# Patient Record
(demographics unavailable — no encounter records)

---

## 2024-11-18 NOTE — PHYSICAL EXAM
[No Acute Distress] : no acute distress [Normal Appearance] : normal appearance [Normal Rhythm and Effort] : normal rhythm and effort [No Cyanosis] : no cyanosis [Normal Color/ Pigmentation] : normal color/ pigmentation [No Focal Deficits] : no focal deficits [Oriented x3] : oriented x3 [Normal Affect] : normal affect [TextBox_11] : pink and moist oral mucosa [TextBox_68] : No breathlessness or cough throughout the conversation

## 2024-11-18 NOTE — REASON FOR VISIT
[Home] : at home, [unfilled] , at the time of the visit. [Medical Office: ([facility])___] : at the medical office located in  [Other:____] : [unfilled] [Patient] : the patient [Self] : self [Consultation] : a consultation [TextBox_44] : cystic fibrosis evaluation [TextBox_13] : MD Ulises Guzman

## 2024-11-18 NOTE — HISTORY OF PRESENT ILLNESS
[TextBox_4] : Patient is a 56-year-old female with a past medical history of mild CHAPO focal bronchiectasis and mild CASA who presents via telemedicine for a cystic fibrosis consultation following a referral from MD Guzman. There is no known family history of cystic fibrosis, however, her 83-year-old mother is known to have bronchiectasis. It was mentioned her mother is colonized with various organisms including MAC. She is  with two children with includes a 20-year-old daughter and a 23-year-old son. She is self-employed as a relationship counselor and .  Bronchiectasis was reportedly identified approximately in February 2022 after seeking a pulmonary evaluation with MD Guzman following after seeking a medical evaluation in urgent care which subsequently led to an emergency room referral due to an abnormal EKG finding and wheezing. Prior to this incident she denied having any chronic pulmonary symptoms such as cough, wheezing, or sputum production. She recalls having recurrent episodes of strep throat infection and often would have food lodged in her tonsils which she was able to manually remove. Ultimately this resulted in her undergoing a tonsillectomy at age of 21. There is also a past history of chronic sinus infections as well as deviated nasal septum. Reportedly after having the tonsillectomy there was notable improvement in sinus symptoms.   Currently, she reports the presence of postnasal drip and the need to throat clear without a significant presence of cough. Periodically when she coughs "pellets" are expectorated. The presence of wheezing, shortness of breath, and exertional dyspnea was denied. She relays having chronic fatigue associated with post-viral syndrome from several years ago.  There is a heaviness in her chest which she relates to symptom to deep fatigue. No airway clearance therapy is currently implemented despite having a flutter PEP device. Additionally, she denies any recent history of recurrent sinopulmonary infection, nor any history hospitalizations related to her pulmonary status.   From a gastrointestinal perspective, there is no history of difficulty of gaining weight in early childhood. She reports issues unexplained weight gain over the years. Symptoms of constipation, diarrhea, bloating, and abdominal discomfort post food consumption were reported. She is currently following an Autoimmune Protocol (AIP) diet which was started in April 2024 following a diagnosis of Dysautonomia. A functional doctor is being seen for Dysautonomia.     Birthplace: [place] Pets: No Work history: Self-employed as a relationship counselor &  - remote

## 2024-11-18 NOTE — REASON FOR VISIT
[Home] : at home, [unfilled] , at the time of the visit. [Medical Office: ([facility])___] : at the medical office located in  [Other:____] : [unfilled] [Patient] : the patient [Self] : self [Consultation] : a consultation [TextBox_44] : cystic fibrosis evaluation [TextBox_13] : MD Ulises Guzman

## 2024-11-18 NOTE — ASSESSMENT
[FreeTextEntry1] : Patient is a 56-year-old female with a past medical history of mild CHAPO focal bronchiectasis and mild CASA who presents via telemedicine for a cystic fibrosis consultation following a referral from MD Guzman. There is no known family history of cystic fibrosis, however, her 83-year-old mother is known to have bronchiectasis. Past history significant for chronic sinus infection with reported symptom improvement following a tonsillectomy at age 21. Currently reporting signs and symptoms suggestive of symptoms associated with chronic sinusitis. Periodically coughs with expectoration of pellets. Poor adherence to ACT despite availability of a flutter. Gastrointestinal symptoms of discomfort post food consumption, bloating, and constipation reported. Experiencing unexplained weight gain inconsistent with signs or symptoms of malabsorption. Plan established to complete a sweat test and [facility] genetic testing using a buccal kit.     # Bronchiectasis # Chronic sinusitis # Screening for cystic fibrosis - Genetic counselor evaluation completed - Provided rationale for cystic fibrosis clinic referral - Discussed evaluating for primary ciliary dyskinesia as a differential diagnosis - Educated on purpose of sweat testing and described the process for completion - Instructed not to apply any creams or lotions to bilateral forearms prior to sweat test completion - Recommended bringing a sweater or warm clothing and a hat to be worn in an attempt to encourage patient to sweat during the process - Informed will receive a phone call regarding sweat test result once available - Educated on what the sweat test lab values mean which is as follows:                   Less than or equal to 29 mmol/L = CF is unlikely                   30 - 59 mmol/L = CF is possible, and additional testing is needed (intermediate result)                   Greater than or equal to 60 mmol/L = CF is likely. - Established a plan to send a [facility] genetic swab kit to the home for completion; Informed may take up to 3 to 4 weeks for finalization of results - Sweat test scheduled for November 20, 2024, at 1pm.

## 2024-11-18 NOTE — REASON FOR VISIT
[Home] : at home, [unfilled] , at the time of the visit. [Medical Office: ([facility])___] : at the medical office located in  [Other:____] : [unfilled] [Patient] : the patient [Self] : self [Consultation] : a consultation [TextBox_44] : cystic fibrosis evaluation [TextBox_13] : MD Ulises Guzman

## 2024-11-18 NOTE — REASON FOR VISIT
[Home] : at home, [unfilled] , at the time of the visit. [Medical Office: ([facility])___] : at the medical office located in  [Other:____] : [unfilled] [Patient] : the patient [Self] : self [Consultation] : a consultation [TextBox_44] : cystic fibrosis evaluation [TextBox_13] : MD Ulises Guzman

## 2024-11-18 NOTE — REVIEW OF SYSTEMS
[Fatigue] : fatigue [Postnasal Drip] : postnasal drip [Cough] : cough [Chest Tightness] : chest tightness [Raynaud] : raynaud [Headache] : headache [Depression] : depression [Negative] : Genitourinary [Fever] : no fever [Chills] : no chills [Sore Throat] : no sore throat [Nasal Congestion] : no nasal congestion [Sputum] : no sputum [Wheezing] : no wheezing [TextBox_94] : + arthritis